# Patient Record
Sex: FEMALE | Race: WHITE
[De-identification: names, ages, dates, MRNs, and addresses within clinical notes are randomized per-mention and may not be internally consistent; named-entity substitution may affect disease eponyms.]

---

## 2018-09-13 ENCOUNTER — HOSPITAL ENCOUNTER (EMERGENCY)
Dept: HOSPITAL 89 - ER | Age: 33
Discharge: HOME | End: 2018-09-13
Payer: MEDICAID

## 2018-09-13 VITALS — DIASTOLIC BLOOD PRESSURE: 80 MMHG | SYSTOLIC BLOOD PRESSURE: 126 MMHG

## 2018-09-13 DIAGNOSIS — R10.12: Primary | ICD-10-CM

## 2018-09-13 LAB — PLATELET COUNT, AUTOMATED: 279 K/UL (ref 150–450)

## 2018-09-13 PROCEDURE — 82040 ASSAY OF SERUM ALBUMIN: CPT

## 2018-09-13 PROCEDURE — 84520 ASSAY OF UREA NITROGEN: CPT

## 2018-09-13 PROCEDURE — 82947 ASSAY GLUCOSE BLOOD QUANT: CPT

## 2018-09-13 PROCEDURE — 96361 HYDRATE IV INFUSION ADD-ON: CPT

## 2018-09-13 PROCEDURE — 96374 THER/PROPH/DIAG INJ IV PUSH: CPT

## 2018-09-13 PROCEDURE — 82247 BILIRUBIN TOTAL: CPT

## 2018-09-13 PROCEDURE — 82310 ASSAY OF CALCIUM: CPT

## 2018-09-13 PROCEDURE — 84155 ASSAY OF PROTEIN SERUM: CPT

## 2018-09-13 PROCEDURE — 82374 ASSAY BLOOD CARBON DIOXIDE: CPT

## 2018-09-13 PROCEDURE — 93005 ELECTROCARDIOGRAM TRACING: CPT

## 2018-09-13 PROCEDURE — 84460 ALANINE AMINO (ALT) (SGPT): CPT

## 2018-09-13 PROCEDURE — 84450 TRANSFERASE (AST) (SGOT): CPT

## 2018-09-13 PROCEDURE — 84075 ASSAY ALKALINE PHOSPHATASE: CPT

## 2018-09-13 PROCEDURE — 84295 ASSAY OF SERUM SODIUM: CPT

## 2018-09-13 PROCEDURE — 99284 EMERGENCY DEPT VISIT MOD MDM: CPT

## 2018-09-13 PROCEDURE — 85025 COMPLETE CBC W/AUTO DIFF WBC: CPT

## 2018-09-13 PROCEDURE — 74177 CT ABD & PELVIS W/CONTRAST: CPT

## 2018-09-13 PROCEDURE — 84484 ASSAY OF TROPONIN QUANT: CPT

## 2018-09-13 PROCEDURE — 96375 TX/PRO/DX INJ NEW DRUG ADDON: CPT

## 2018-09-13 PROCEDURE — 81001 URINALYSIS AUTO W/SCOPE: CPT

## 2018-09-13 PROCEDURE — 82435 ASSAY OF BLOOD CHLORIDE: CPT

## 2018-09-13 PROCEDURE — 71046 X-RAY EXAM CHEST 2 VIEWS: CPT

## 2018-09-13 PROCEDURE — 84132 ASSAY OF SERUM POTASSIUM: CPT

## 2018-09-13 PROCEDURE — 82565 ASSAY OF CREATININE: CPT

## 2018-09-13 NOTE — RADIOLOGY IMAGING REPORT
FACILITY: [facility] 

 

PATIENT NAME: Rosio Sharma

: 1985

MR: [id number]

V: [id number]

EXAM DATE: 

ORDERING PHYSICIAN: DANA TITUS

TECHNOLOGIST: 

 

Location: [facility]

Patient: Rosio Sharma

: 1985

MRN: [id number]

Visit/Account:[id number]

Date of Sevice:  2018

 

ACCESSION #: [id number]

 

2 VIEWS CHEST

 

INDICATION: Chest and abdomen pain

 

COMPARISON: 2012.

 

FINDINGS:

Cardiomediastinal silhouette and pulmonary vessels within normal limits.

There is no focal infiltrate or lobar consolidation.

There is no pneumothorax or pleural effusion.

No nodule.

Upper abdomen is unremarkable. No acute bony abnormality.

 

IMPRESSION:

1. No acute cardiopulmonary process.

 

Report Dictated By: Richie Gonzales at 2018 7:47 PM

 

Report E-Signed By: Richie Gonzales  at 2018 7:49 PM

 

WSN:[id number]

## 2018-09-13 NOTE — RADIOLOGY IMAGING REPORT
FACILITY: [facility] 

 

PATIENT NAME: Rosio Sharma

: 1985

MR: [id number]

V: [id number]

EXAM DATE: 

ORDERING PHYSICIAN: DANA TITUS

TECHNOLOGIST: 

 

Location: [facility] [place]

Patient: Rosio Sharma

: 1985

MRN: [id number]

Visit/Account:[id number]

Date of Sevice:  2018

 

ACCESSION #: [id number]

 

ABDOMEN/PELVIS WITH CONTRAST

 

HISTORY: Abdominal and chest pain.

 

COMPARISON: None.

 

TECHNIQUE: Axial images were obtained from the lung bases through the symphysis pubis with intravenou
s contrast. Sagittal and coronal reformats were performed.

 

One of the following dose optimization techniques was utilized in the performance of this exam: Autom
ated exposure control; adjustment of the mA and/or kV according to the patient's size; or use of an i
terative reconstruction technique. Specific details can be referenced in the facility's radiology CT 
exam operational policy.

 

CONTRAST: 75 mL IV Isovue-370.

 

 

FINDINGS:

 

Lower chest: Normal.

 

Liver: Liver is diffusely decreased in attenuation and enlarged, measuring 21.5 cm, compatible with h
epatic steatosis.

 

Gallbladder/biliary: There are partially calcified stones within the gallbladder. Gallbladder is cont
racted. No pericholecystic fluid or stranding. No intrahepatic or extrahepatic ductal dilation.

 

Pancreas: Normal.

 

Spleen: Normal.

 

Adrenals: There is a 1.2 x 1.0 cm left adrenal lesion (image 42 series 2). The right adrenal gland is
 normal.

 

Kidneys/ureters/bladder: Normal.

 

GI/mesentery/peritoneal cavity: There is no bowel obstruction. There is no wall thickening or pericol
onic stranding. The appendix is normal. There is sigmoid and descending colon diverticulosis without 
diverticulitis.

 

Vessels: There is mild atherosclerotic disease without aneurysm. No dissection.

 

Nodes: Normal.

 

Pelvis: Uterus is absent. Left ovary is not identified and may be surgically absent. Right ovary is n
ormal and contains a dominant follicle. There are phleboliths.

 

Bones/vertebra/soft tissues: There is mild wedging of T11, likely physiologic. There is mild degenera
tive change of the spine. There is right central disc osteophyte at T11-12 (image 36) that is mildly 
deforming the right ventral thecal sac. There are numerous Schmorl nodes. No listhesis.

 

IMPRESSION:

1. Cholelithiasis, but no CT evidence for cholecystitis.

2. Hepatic steatosis. It can progress to steatohepatitis and eventual cirrhosis.

3. Mild atherosclerosis, advanced for age.

4. Degenerative changes of the spine.

5. 1.2 cm left adrenal lesion given size and no reported history of cancer, it is probably benign. Co
nsider 12 month follow up adrenal CT scan.

 

Report Dictated By: Lillian Salvador at 2018 8:11 PM

 

Report E-Signed By: Lillian Salvador  at 2018 8:23 PM

 

WSN:[id number]

## 2018-09-13 NOTE — ER REPORT
History and Physical


Time Seen By MD:  19:05


Hx. of Stated Complaint:  


LEFT SIDED CHEST PAIN FOR 2 DAYS


HPI/ROS


CHIEF COMPLAINT: Chest pain





HISTORY OF PRESENT ILLNESS: 33-year-old female patient presents to emergency 


room with complaint of left-sided chest pain. Patient states this been going on 


for the last 2 days. Patient states that she was sleeping and woke up with pain 


in the left side of the chest. She states pain is worse when she takes a deep 


breath. She states she's not had any fevers, chills, vomiting or diarrhea. 


Patient states she has been nauseated. Patient states that she has not taken any


medication for this. She states that there is nothing seems to make the pain 


better or worse. She states that she does not have any shortness of breath.





REVIEW OF SYSTEMS:


Respiratory: No cough, no dyspnea.


Cardiovascular: As noted above.


Gastrointestinal: No vomiting, no abdominal pain.


Musculoskeletal: No back pain.


Allergies:  


Coded Allergies:  


     amoxicillin (Unverified  Allergy, Mild, hives, 9/13/18)


   


   okay with cephalosporins


     latex (Unverified  Allergy, Mild, rash, 9/13/18)


Home Meds


Active Scripts


Hydrocodone Bit/Acetaminophen (HYDROCODON-ACETAMINOPHEN 5-325) 1 Each Tablet, 1 


EACH PO Q4-6H PRN for PAIN, #12 TAB


   Prov:DANA TITUS         9/13/18


Discontinued Reported Medications


Ibuprofen (IBUPROFEN) 200 Mg Tablet, 1 TAB PO PRN, TAB


   5/23/17


Acetaminophen (TYLENOL) 325 Mg Tablet, 325 MG PO PRN, TAB


   5/23/17


Discontinued Scripts


Ondansetron (ZOFRAN ODT) 4 Mg Tab.rapdis, 4 MG PO Q6H PRN for NAUSEA/VOMITING, 


#20 TAB.SOL 0 Refills


   Prov:CHIVO DOLL MD         8/16/17


Hydrocodone Bit/Acetaminophen (HYDROCODON-ACETAMINOPHEN 5-325) 1 Each Tablet, 1 


EACH PO Q4H PRN for PAIN, #12 TAB 0 Refills


   Prov:CHIVO DOLL MD         8/16/17


Past Medical/Surgical History


Patient has a past medical history of smoking, precancerous lesions removed from


cervix, left arm fracture, or alcohol use.


Patient has surgical history of hysterectomy, tubal ligation.


Reviewed Nurses Notes:  Yes


Hx Smoking:  Yes (1ppd x  12 yrs, trying to quit)


Smoking Status:  Current: Every Day Smoker


Hx Substance Use Disorder:  No


Hx Alcohol Use:  Yes (rare 3 x yr)


Constitutional





Vital Sign - Last 24 Hours








 9/13/18 9/13/18 9/13/18 9/13/18





 18:56 18:57 19:00 19:09


 


Temp  97.9  


 


Pulse  98  94


 


Resp  20  9


 


B/P (MAP) 181/106 (131) 181/106 158/100 (119) 


 


Pulse Ox  95  


 


O2 Delivery  Room Air  


 


    





 9/13/18 9/13/18 9/13/18 9/13/18





 19:15 19:24 19:30 19:45


 


Pulse  84  


 


Resp  11  


 


B/P (MAP) 127/102 (110)  132/94 (107) 146/96 (113)


 


Pulse Ox  95  





 9/13/18 9/13/18 9/13/18 9/13/18





 19:50 20:05 20:20 20:30


 


Pulse 100 95 97 


 


Resp 9 12 16 


 


B/P (MAP)    126/80 (95)


 


Pulse Ox 92 94 92 





 9/13/18   





 20:50   


 


Pulse 82   


 


Resp 24   


 


Pulse Ox 93   








Physical Exam


  General Appearance: The patient is alert, has no immediate need for airway 


protection and no current signs of toxicity.


Respiratory: Chest is non tender, lungs are clear to auscultation.


Cardiac: regular rate and rhythm


Gastrointestinal: Abdomen is soft and tender in the left upper quadrant, no 


masses, bowel sounds normal.


Musculoskeletal:  Neck: Neck is supple and non tender.


   Extremities have full range of motion and are non tender.


Skin: No rashes or lesions.





DIFFERENTIAL DIAGNOSIS: After history and physical exam differential diagnosis 


was considered for abdominal pain including but not limited to appendicitis, 


cholecystitis, gastritis and urinary tract infection.





Medical Decision Making


Data Points


Result Diagram:  


9/13/18 1910 9/13/18 1910





Laboratory





Hematology








Test


 9/13/18


19:10 9/13/18


20:00


 


Red Blood Count


 5.14 M/uL


(4.17-5.56) 





 


Mean Corpuscular Volume


 91.3 fL


(80.0-96.0) 





 


Mean Corpuscular Hemoglobin


 32.5 pg


(26.0-33.0) 





 


Mean Corpuscular Hemoglobin


Concent 35.6 g/dL


(32.0-36.0) 





 


Red Cell Distribution Width


 13.5 %


(11.5-14.5) 





 


Mean Platelet Volume


 9.2 fL


(7.2-11.1) 





 


Neutrophils (%) (Auto)


 51.3 %


(39.4-72.5) 





 


Lymphocytes (%) (Auto)


 37.5 %


(17.6-49.6) 





 


Monocytes (%) (Auto)


 8.0 %


(4.1-12.4) 





 


Eosinophils (%) (Auto)


 2.0 %


(0.4-6.7) 





 


Basophils (%) (Auto)


 1.2 %


(0.3-1.4) 





 


Nucleated RBC Relative Count


(auto) 0.0 /100WBC 


 





 


Neutrophils # (Auto)


 7.0 K/uL


(2.0-7.4) 





 


Lymphocytes # (Auto)


 5.1 K/uL


(1.3-3.6) 





 


Monocytes # (Auto)


 1.1 K/uL


(0.3-1.0) 





 


Eosinophils # (Auto)


 0.3 K/uL


(0.0-0.5) 





 


Basophils # (Auto)


 0.2 K/uL


(0.0-0.1) 





 


Nucleated RBC Absolute Count


(auto) 0.01 K/uL 


 





 


Sodium Level


 137 mmol/L


(137-145) 





 


Potassium Level


 3.9 mmol/L


(3.5-5.0) 





 


Chloride Level


 103 mmol/L


() 





 


Carbon Dioxide Level


 24 mmol/L


(22-31) 





 


Blood Urea Nitrogen


 13 mg/dl


(7-18) 





 


Creatinine


 0.60 mg/dl


(0.52-1.04) 





 


Glomerular Filtration Rate


Calc > 60.0 


 





 


Random Glucose


 88 mg/dl


() 





 


Calcium Level


 9.0 mg/dl


(8.4-10.2) 





 


Total Bilirubin


 0.6 mg/dl


(0.2-1.3) 





 


Aspartate Amino Transf


(AST/SGOT) 32 U/L (0-35) 


 





 


Alanine Aminotransferase


(ALT/SGPT) 39 U/L (0-56) 


 





 


Alkaline Phosphatase 64 U/L (0-126)  


 


Troponin I < 0.012 ng/ml  


 


Total Protein


 7.3 g/dl


(6.3-8.2) 





 


Albumin


 4.1 g/dl


(3.5-5.0) 





 


Urine Color  Yellow 


 


Urine Clarity  Clear 


 


Urine pH


 


 5.0 pH


(4.8-9.5)


 


Urine Specific Gravity  1.034 


 


Urine Protein


 


 Negative mg/dL


(NEGATIVE)


 


Urine Glucose (UA)


 


 Negative mg/dL


(NEGATIVE)


 


Urine Ketones


 


 Negative mg/dL


(NEGATIVE)


 


Urine Blood


 


 Negative


(NEGATIVE)


 


Urine Nitrite


 


 Negative


(NEGATIVE)


 


Urine Bilirubin


 


 Negative


(NEGATIVE)


 


Urine Urobilinogen


 


 Negative mg/dL


(0.2-1.9)


 


Urine Leukocyte Esterase


 


 Negative


(NEGATIVE)


 


Urine RBC


 


 None /HPF


(0-2/HPF)


 


Urine WBC


 


 <1 /HPF


(0-5/HPF)


 


Urine Squamous Epithelial


Cells 


 Many /LPF


(</=FEW)


 


Urine Bacteria


 


 Negative /HPF


(NONE-FEW)


 


Urine Mucus


 


 None /HPF


(NONE-FEW)








Chemistry








Test


 9/13/18


19:10 9/13/18


20:00


 


White Blood Count


 13.6 k/uL


(4.5-11.0) 





 


Red Blood Count


 5.14 M/uL


(4.17-5.56) 





 


Hemoglobin


 16.7 g/dL


(12.0-16.0) 





 


Hematocrit


 46.9 %


(34.0-47.0) 





 


Mean Corpuscular Volume


 91.3 fL


(80.0-96.0) 





 


Mean Corpuscular Hemoglobin


 32.5 pg


(26.0-33.0) 





 


Mean Corpuscular Hemoglobin


Concent 35.6 g/dL


(32.0-36.0) 





 


Red Cell Distribution Width


 13.5 %


(11.5-14.5) 





 


Platelet Count


 279 K/uL


(150-450) 





 


Mean Platelet Volume


 9.2 fL


(7.2-11.1) 





 


Neutrophils (%) (Auto)


 51.3 %


(39.4-72.5) 





 


Lymphocytes (%) (Auto)


 37.5 %


(17.6-49.6) 





 


Monocytes (%) (Auto)


 8.0 %


(4.1-12.4) 





 


Eosinophils (%) (Auto)


 2.0 %


(0.4-6.7) 





 


Basophils (%) (Auto)


 1.2 %


(0.3-1.4) 





 


Nucleated RBC Relative Count


(auto) 0.0 /100WBC 


 





 


Neutrophils # (Auto)


 7.0 K/uL


(2.0-7.4) 





 


Lymphocytes # (Auto)


 5.1 K/uL


(1.3-3.6) 





 


Monocytes # (Auto)


 1.1 K/uL


(0.3-1.0) 





 


Eosinophils # (Auto)


 0.3 K/uL


(0.0-0.5) 





 


Basophils # (Auto)


 0.2 K/uL


(0.0-0.1) 





 


Nucleated RBC Absolute Count


(auto) 0.01 K/uL 


 





 


Glomerular Filtration Rate


Calc > 60.0 


 





 


Calcium Level


 9.0 mg/dl


(8.4-10.2) 





 


Total Bilirubin


 0.6 mg/dl


(0.2-1.3) 





 


Aspartate Amino Transf


(AST/SGOT) 32 U/L (0-35) 


 





 


Alanine Aminotransferase


(ALT/SGPT) 39 U/L (0-56) 


 





 


Alkaline Phosphatase 64 U/L (0-126)  


 


Troponin I < 0.012 ng/ml  


 


Total Protein


 7.3 g/dl


(6.3-8.2) 





 


Albumin


 4.1 g/dl


(3.5-5.0) 





 


Urine Color  Yellow 


 


Urine Clarity  Clear 


 


Urine pH


 


 5.0 pH


(4.8-9.5)


 


Urine Specific Gravity  1.034 


 


Urine Protein


 


 Negative mg/dL


(NEGATIVE)


 


Urine Glucose (UA)


 


 Negative mg/dL


(NEGATIVE)


 


Urine Ketones


 


 Negative mg/dL


(NEGATIVE)


 


Urine Blood


 


 Negative


(NEGATIVE)


 


Urine Nitrite


 


 Negative


(NEGATIVE)


 


Urine Bilirubin


 


 Negative


(NEGATIVE)


 


Urine Urobilinogen


 


 Negative mg/dL


(0.2-1.9)


 


Urine Leukocyte Esterase


 


 Negative


(NEGATIVE)


 


Urine RBC


 


 None /HPF


(0-2/HPF)


 


Urine WBC


 


 <1 /HPF


(0-5/HPF)


 


Urine Squamous Epithelial


Cells 


 Many /LPF


(</=FEW)


 


Urine Bacteria


 


 Negative /HPF


(NONE-FEW)


 


Urine Mucus


 


 None /HPF


(NONE-FEW)








Urinalysis








Test


 9/13/18


20:00


 


Urine Color Yellow 


 


Urine Clarity Clear 


 


Urine pH


 5.0 pH


(4.8-9.5)


 


Urine Specific Gravity 1.034 


 


Urine Protein


 Negative mg/dL


(NEGATIVE)


 


Urine Glucose (UA)


 Negative mg/dL


(NEGATIVE)


 


Urine Ketones


 Negative mg/dL


(NEGATIVE)


 


Urine Blood


 Negative


(NEGATIVE)


 


Urine Nitrite


 Negative


(NEGATIVE)


 


Urine Bilirubin


 Negative


(NEGATIVE)


 


Urine Urobilinogen


 Negative mg/dL


(0.2-1.9)


 


Urine Leukocyte Esterase


 Negative


(NEGATIVE)


 


Urine RBC


 None /HPF


(0-2/HPF)


 


Urine WBC


 <1 /HPF


(0-5/HPF)


 


Urine Squamous Epithelial


Cells Many /LPF


(</=FEW)


 


Urine Bacteria


 Negative /HPF


(NONE-FEW)


 


Urine Mucus


 None /HPF


(NONE-FEW)











EKG/Imaging


EKG Interpretation


12 lead EKG:


      Rhythm: normal sinus rhythm with ventricular rate of 83 beats for minute


      Axis: normal 


      QRS: normal


      ST segments: normal


Imaging


ABDOMEN/PELVIS WITH CONTRAST


 


HISTORY: Abdominal and chest pain.


 


COMPARISON: None.


 


TECHNIQUE: Axial images were obtained from the lung bases through the symphysis 


pubis with intravenous contrast. Sagittal and coronal reformats were performed.


 


One of the following dose optimization techniques was utilized in the 


performance of this exam: Automated exposure control; adjustment of the mA 


and/or kV according to the patient's size; or use of an iterative reconstruction


technique. Specific details can be referenced in the facility's radiology CT 


exam operational policy.


 


CONTRAST: 75 mL IV Isovue-370.


 


 


FINDINGS:


 


Lower chest: Normal.


 


Liver: Liver is diffusely decreased in attenuation and enlarged, measuring 21.5 


cm, compatible with hepatic steatosis.


 


Gallbladder/biliary: There are partially calcified stones within the 


gallbladder. Gallbladder is contracted. No pericholecystic fluid or stranding. 


No intrahepatic or extrahepatic ductal dilation.


 


Pancreas: Normal.


 


Spleen: Normal.


 


Adrenals: There is a 1.2 x 1.0 cm left adrenal lesion (image 42 series 2). The 


right adrenal gland is normal.


 


Kidneys/ureters/bladder: Normal.


 


GI/mesentery/peritoneal cavity: There is no bowel obstruction. There is no wall 


thickening or pericolonic stranding. The appendix is normal. There is sigmoid 


and descending colon diverticulosis without diverticulitis.


 


Vessels: There is mild atherosclerotic disease without aneurysm. No dissection.


 


Nodes: Normal.


 


Pelvis: Uterus is absent. Left ovary is not identified and may be surgically 


absent. Right ovary is normal and contains a dominant follicle. There are 


phleboliths.


 


Bones/vertebra/soft tissues: There is mild wedging of T11, likely physiologic. 


There is mild degenerative change of the spine. There is right central disc 


osteophyte at T11-12 (image 36) that is mildly deforming the right ventral 


thecal sac. There are numerous Schmorl nodes. No listhesis.


 


IMPRESSION:


1. Cholelithiasis, but no CT evidence for cholecystitis.


2. Hepatic steatosis. It can progress to steatohepatitis and eventual cirrhosis.


3. Mild atherosclerosis, advanced for age.


4. Degenerative changes of the spine.


5. 1.2 cm left adrenal lesion given size and no reported history of cancer, it 


is probably benign. Consider 12 month follow up adrenal CT scan.


 


Report Dictated By: Lillian Salvador at 9/13/2018 8:11 PM


 


Report E-Signed By: Lillian Salvador  at 9/13/2018 8:23 PM





2 VIEWS CHEST


 


INDICATION: Chest and abdomen pain


 


COMPARISON: 6/11/2012.


 


FINDINGS:


Cardiomediastinal silhouette and pulmonary vessels within normal limits.


There is no focal infiltrate or lobar consolidation.


There is no pneumothorax or pleural effusion.


No nodule.


Upper abdomen is unremarkable. No acute bony abnormality.


 


IMPRESSION:


1. No acute cardiopulmonary process.


 


Report Dictated By: Richie Gonzales at 9/13/2018 7:47 PM


 


Report E-Signed By: Richie Gonzales  at 9/13/2018 7:49 PM





ED Course/Re-evaluation


ED Course


Patient was admitted to exam room, history and physical were obtained. The 


differential diagnoses were considered. On examination patient had tenderness to


the left upper quadrant of the abdomen, no tenderness to the ribs or chest. A 


CBC, CMP, troponin, EKG, chest x-ray, CT scan of abdomen and pelvis were done. 


The patient had a slightly elevated white count with a left shift, 13,000. 


Patient is afebrile emergency room. EKG was a normal sinus rhythm, chest x-ray 


was negative, CT scan showed no acute findings. CMP was unremarkable. I 


discussed the findings with the patient. I did reexamine her looking for any 


signs of a herpetic rash. However there is nothing that was visible. We will go 


ahead and discharge patient home at this time. We'll treat her with lumbar spine


pain medication. She is follow-up with her primary care provider in the next 


week. I discussed this with patient who verbalized understanding and agreement 


with plan.


Decision to Disposition Date:  Sep 13, 2018


Decision to Disposition Time:  21:04





Depart


Departure


Latest Vital Signs





Vital Signs








  Date Time  Temp Pulse Resp B/P (MAP) Pulse Ox O2 Delivery O2 Flow Rate FiO2


 


9/13/18 20:50  82 24  93   


 


9/13/18 20:30    126/80 (95)    


 


9/13/18 18:57 97.9     Room Air  








Impression:  


   Primary Impression:  


   Abdominal pain


Condition:  Improved


Disposition:  HOME OR SELF-CARE


New Scripts


Hydrocodone Bit/Acetaminophen (HYDROCODON-ACETAMINOPHEN 5-325) 1 Each Tablet


1 EACH PO Q4-6H PRN for PAIN, #12 TAB


   Prov: DANA TITUS         9/13/18


Patient Instructions:  Abdominal Pain (ED)





Additional Instructions:  


Limit activity by pain.


Increase fluid intake.


Follow up with your primary care provider in the next week.


Return to the ER if pain worsens.





Problem Qualifiers








   Primary Impression:  


   Abdominal pain


   Abdominal location:  left upper quadrant  Qualified Codes:  R10.12 - Left 


   upper quadrant pain








DANA TITUS                Sep 13, 2018 20:12

## 2018-09-13 NOTE — EKG
FACILITY: [facility] - [place] 

 

PATIENT NAME: MISAEL FRANKLIN

: [id number]

MR: [id number]

V: [id number]

EXAM DATE: 

ORDERING PHYSICIAN: DANA TITUS

TECHNOLOGIST: PANTIER

 

Test Reason : 

Blood Pressure : ***/*** mmHG

Vent. Rate : 083 BPM     Atrial Rate : 083 BPM

   P-R Int : 162 ms          QRS Dur : 088 ms

    QT Int : 402 ms       P-R-T Axes : 048 038 045 degrees

   QTc Int : 472 ms

 

Normal sinus rhythm

Normal ECG

No previous ECGs available

Confirmed by Prashant Cohen ([id number]) on 2018 8:14:46 PM

 

Referred By:             Confirmed By:Prashant Salas

## 2018-09-17 ENCOUNTER — HOSPITAL ENCOUNTER (OUTPATIENT)
Dept: HOSPITAL 89 - ZZSENDIN | Age: 33
End: 2018-09-17
Attending: PHYSICIAN ASSISTANT
Payer: MEDICAID

## 2018-09-17 DIAGNOSIS — R10.817: Primary | ICD-10-CM

## 2018-09-17 PROCEDURE — 83690 ASSAY OF LIPASE: CPT

## 2018-09-28 ENCOUNTER — HOSPITAL ENCOUNTER (OUTPATIENT)
Dept: HOSPITAL 89 - RAD | Age: 33
End: 2018-09-28
Attending: SURGERY
Payer: MEDICAID

## 2018-09-28 VITALS — WEIGHT: 178 LBS | HEIGHT: 66 IN | BODY MASS INDEX: 28.61 KG/M2

## 2018-09-28 DIAGNOSIS — K21.9: Primary | ICD-10-CM

## 2018-09-28 PROCEDURE — 74240 X-RAY XM UPR GI TRC 1CNTRST: CPT

## 2018-09-28 NOTE — RADIOLOGY IMAGING REPORT
FACILITY: [facility] [place] 

 

PATIENT NAME: Rosio Sharma

: 1985

MR: [id number]

V: [id number]

EXAM DATE: 

ORDERING PHYSICIAN: JOHN ULLRICH

TECHNOLOGIST: 

 

Location: [facility] [place]

Patient: Rosio Sharma

: 1985

MRN: [id number]

Visit/Account:[id number]

Date of Sevice:  2018

 

ACCESSION #: [id number]

 

Exam type: UPPER GI SERIES W/O AIR

 

History: Postprandial vomiting and reflux

 

Comparison: None.

 

Findings:

 

Double contrast upper GI series was performed with thick and thin barium and air contrast.  Small to 
moderate amount of gastroesophageal reflux was observed.  There is no evidence of esophageal narrowin
g or mucosal erosions.  Hiatal hernia was not demonstrated.  No abnormality of the stomach duodenal b
ulb or duodenal C-loop was seen.  The fluoroscopy dose area product was 1107.67 micro-Gray per meter 
squared

 

IMPRESSION:

 

1.  Small to moderate amount of gastroesophageal reflux was observed although no evidence of esophage
al narrowing or mucosal erosion

 

The remainder the upper GI series appeared unremarkable

 

Report Dictated By: Kemi Jackson MD at 2018 12:03 PM

 

Report E-Signed By: Kemi Jackson MD  at 2018 12:05 PM

 

WSN:MARJORIE

## 2018-10-09 ENCOUNTER — HOSPITAL ENCOUNTER (OUTPATIENT)
Dept: HOSPITAL 89 - OR | Age: 33
Discharge: HOME | End: 2018-10-09
Attending: SURGERY
Payer: MEDICAID

## 2018-10-09 VITALS — DIASTOLIC BLOOD PRESSURE: 76 MMHG | SYSTOLIC BLOOD PRESSURE: 124 MMHG

## 2018-10-09 VITALS — WEIGHT: 177 LBS | HEIGHT: 66 IN | BODY MASS INDEX: 28.45 KG/M2

## 2018-10-09 VITALS — DIASTOLIC BLOOD PRESSURE: 87 MMHG | SYSTOLIC BLOOD PRESSURE: 126 MMHG

## 2018-10-09 VITALS — SYSTOLIC BLOOD PRESSURE: 117 MMHG | DIASTOLIC BLOOD PRESSURE: 72 MMHG

## 2018-10-09 VITALS — DIASTOLIC BLOOD PRESSURE: 75 MMHG | SYSTOLIC BLOOD PRESSURE: 108 MMHG

## 2018-10-09 VITALS — SYSTOLIC BLOOD PRESSURE: 115 MMHG | DIASTOLIC BLOOD PRESSURE: 74 MMHG

## 2018-10-09 VITALS — DIASTOLIC BLOOD PRESSURE: 63 MMHG | SYSTOLIC BLOOD PRESSURE: 100 MMHG

## 2018-10-09 VITALS — DIASTOLIC BLOOD PRESSURE: 82 MMHG | SYSTOLIC BLOOD PRESSURE: 127 MMHG

## 2018-10-09 DIAGNOSIS — R11.10: ICD-10-CM

## 2018-10-09 DIAGNOSIS — R10.10: ICD-10-CM

## 2018-10-09 DIAGNOSIS — K80.80: Primary | ICD-10-CM

## 2018-10-09 DIAGNOSIS — J44.9: ICD-10-CM

## 2018-10-09 PROCEDURE — 43239 EGD BIOPSY SINGLE/MULTIPLE: CPT

## 2018-10-09 PROCEDURE — 94640 AIRWAY INHALATION TREATMENT: CPT

## 2018-10-09 PROCEDURE — 88304 TISSUE EXAM BY PATHOLOGIST: CPT

## 2018-10-09 PROCEDURE — 87077 CULTURE AEROBIC IDENTIFY: CPT

## 2018-10-09 PROCEDURE — 88305 TISSUE EXAM BY PATHOLOGIST: CPT

## 2018-10-09 PROCEDURE — 47562 LAPAROSCOPIC CHOLECYSTECTOMY: CPT

## 2018-10-09 NOTE — SHORT(OUTPT) DISCHARGE SUMMARY
Discharge Summary


Reason for Hosp/Final Diag:  


(1) Postprandial vomiting


Status:  Chronic


Hospital Course & Plan:  EGD with biopsies and robotic cholecystectomy completed


without problems.





(2) Cholelithiasis


Status:  Chronic


(3) Abdominal pain


Status:  Acute


Departure


Discharge to:  Home, Self Care





Discharge Instructions


Home Meds


Active Scripts


Docusate Sodium (COLACE) 100 Mg Capsule, 1 CAP PO BID, #30 CAP 0 Refills


   TAKE WITH A FULL GLASS OF WATER


   Prov:ULLRICH,JOHN A MD         10/9/18


Oxycodone Hcl/Acet 5/325 Mg (ENDOCET 5-325 TABLET) 1 Each Tablet, 1-2 TAB PO Q4H


PRN for PAIN, #30 TAB 0 Refills


   Prov:ULLRICH,JOHN A MD         10/9/18


Reported Medications


Acetaminophen (TYLENOL EXTRA STRENGTH) 500 Mg Tablet, 1000 MG PO, TAB


   9/28/18


Gemfibrozil (GEMFIBROZIL) 600 Mg Tablet, 600 MG PO BID


   9/28/18


Dexlansoprazole (DEXILANT) Unknown Strength Cap., 60 MG PO HS


   9/28/18


Follow up Referrals:  


General Surgery - 10/23/18 @ Surgery, General with ULLRICH,JOHN A MD You have a 


follow up appointment scheduled with Dr. Ullrich on Tuesday, 10/23/18, at 


3:30pm.





Diet:  Regular


Activity:  As Tolerated


Special Instructions:  


You may remove the white surgical dressings on Thursday, 10/11/18, then


you can shower.  After showering, leave the incisions open to air but


leave the steristrips in place until they fall off on their own.  Do not


immerse the incisions for 2 weeks.





Problem Qualifiers





(1) Cholelithiasis:  


Cholelithiasis location:  gallbladder  Cholecystitis presence:  without 


cholecystitis  Biliary obstruction:  without biliary obstruction  Qualified 


Codes:  K80.20 - Calculus of gallbladder without cholecystitis without 


obstruction


(2) Abdominal pain:  


Abdominal location:  upper abdomen, unspecified  Qualified Codes:  R10.10 - Up


per abdominal pain, unspecified








ULLRICH,JOHN A MD               Oct 9, 2018 09:03

## 2018-10-09 NOTE — POST OPERATIVE PROGRESS NOTE
Post Operative Progress Note


Date:  Oct 9, 2018


Time:  09:03


Surgeon:  


Ullrich





Dictation number:  [id number]


Anesthesia:  


GETA by Dr. Scanlon


Pre-Op Diagnosis:  


Symptomatic gallstones


Postprandial emesis


Upper abdominal pain


Post-Op Diagnosis:  


BRANDI


Findings:  


Normal EGD, biopsies pending


Procedure(s):  


EGD with biopsies


Robotic cholecystectomy


Specimen Removed:(May be N/A):  


Duodenum


Pyloritek


Gallbladder and contents


Complications:  


None


Fluids:  


See anesthesia record


Estimated Blood Loss:  


Minimal


Date OP Note Dictated:  Oct 9, 2018


Time OP Note Dictated:  09:05











ULLRICH,JOHN A MD               Oct 9, 2018 09:12

## 2018-10-09 NOTE — OPERATIVE REPORT 1
EVENT DATE: October 9, 2018

SURGEON: John Ullrich, M.D.  

ANESTHESIOLOGIST: Anthony Scanlon MD 

ANESTHESIA: General endotracheal.





PREOPERATIVE DIAGNOSIS  

1.  Upper abdominal pain.

2.  Postprandial emesis.

3.  Symptomatic gallstones.



POSTOPERATIVE DIAGNOSIS 

1.  Upper abdominal pain.

2.  Postprandial emesis.

3.  Symptomatic gallstones.



PROCEDURE PERFORMED 

1.  Esophagogastroduodenoscopy with biopsies.  Please refer to the endoscopy 

report for details about this.

2.  Robotic cholecystectomy.



ESTIMATED BLOOD LOSS 

Minimal.



FINDINGS

Patient's EGD was unremarkable.  I did not find any evidence of inflammation, 

ulcers or other abnormalities. Biopsies are pending.  The gallbladder had small 

flecks of gallstones in it but no obvious gross inflammation.



SPECIMENS 

1.  Duodenal biopsy.

2.  PyloriTek.

3.  Gallbladder and contents.  



INDICATIONS 

This is a 33-year-old female who presented to my office with upper abdominal 

pain and postprandial emesis.  Her pain was somewhat in the epigastric midline 

as well as the right upper quadrant.  There was concern about an upper GI cause 

separate from her gallbladder so she was requesting to have an EGD in addition 

to having her gallbladder removed since her ultrasound did show gallstones. 



DESCRIPTION OF PROCEDURE 

The patient was brought to the operating room and placed supine on the operating

table.  General endotracheal anesthesia was administered and we completed the 

EGD first.  Please refer to the endoscopy report for details about this.  After 

the EGD was completed, her abdomen was prepped and draped in the sterile 

fashion.  Another time-out was completed. I injected the infraumbilical skin 

with 0.5% ropivacaine plain.  I made a curvilinear smiley face type incision in 

the infraumbilical rim and dissected through the dermis and into the 

subcutaneous fat.  I identified the midline fascia, made a vertical incision in 

the midline fascia, grasped the fascial ligatures with Kocher clamps and 

retracted the abdominal wall towards the ceiling to move it away from the 

underlying viscera.  I then was able to penetrate the peritoneal cavity with my 

finger and then placed two interrupted 0 Vicryl sutures transversely through the

vertical fascial defect and the 12 mm robotic Scruggs type port though this wound

and secured into place with the sutures.  I insufflated the abdomen to a 

pressure of 50 mmHg and inserted the robotic camera into the patient's abdomen. 

Gross inspection of the abdominal cavity did not reveal any obvious evidence of 

gross pathology or entry related injuries.  Next, I placed an 8 mm robotic port 

in the right mid abdomen and two 8 mm robotic ports in the left side of the 

abdomen, one in the left mid abdomen higher than the level of the umbilicus and 

then one in the anterior axillary line in the subcostal area.  I placed the 

patient in reverse Trendelenburg and planed towards the left and moved the 

viscera away from the right upper quadrant and brought the robot in and docked 

and targeted the robot.  I then inserted the instruments under direct 

visualization and when everything was set up I scrubbed out and went to the 

console.  I grasped the fundus of the gallbladder and retracted it with a 

ProGrasp grasper and there were some adhesions of the gallbladder, which I 

easily took down by retracting them with the caudia and using the hook. Once I 

got down to the infundibulum, I divided the peritoneum overlying the 

infundibulum in both the medial and lateral aspects of the gallbladder and 

stripped the peritoneum and subperitoneal contents down from the infundibulum 

until I cleaned off the cystic duct. I used the FireFly to identify the cystic 

duct and I could also clearly see the common bile duct, which glowed brightly, 

and I was well away from the common bile duct.  Once I cleaned off the cystic 

duct circumferentially, I passed three clips distally and one clip at the 

infundibulum of the cystic duct and divided the duct between clips.  I then 

continued dissecting and I had not yet identified the artery so I was very 

careful in this regard and then I ultimately was able to identify the cystic 

artery, which was very posterior, so this was cleaned off circumferentially and 

I clipped it proximally and distally and divided between clips.  I then 

 the posterior attachments of the gallbladder to separate from the 

gallbladder fossa and then placed the gallbladder in a surgical specimen 

retrieval bag and removed it from the abdomen through the umbilical port site.  

I irrigated and dried the right upper quadrant and made sure there was no bile 

leaks or bleeding and there was none.  The clips were all in good position and 

the gallbladder fossa was dry.  I then removed all the instruments and undocked 

the robot and then placed a figure-of-eight 0 Vicryl suture transversely through

the defect in the umbilicus and tied all three of these down with good 

reapproximation of the fascial edges.  I then closed the skin at each portal 

site with 4-0 Monocryl subcuticular sutures. Skin was cleaned and dried and 

steri-strips applied followed by sterile surgical dressings.  The patient was 

awakened and extubated in the operating room and transferred to the recovery 

room in stable condition, having tolerated the procedure without any apparent 

problems.





[place]

## 2018-10-23 ENCOUNTER — HOSPITAL ENCOUNTER (OUTPATIENT)
Dept: HOSPITAL 89 - LAB | Age: 33
End: 2018-10-23
Attending: SURGERY
Payer: MEDICAID

## 2018-10-23 DIAGNOSIS — R10.9: Primary | ICD-10-CM

## 2018-10-23 DIAGNOSIS — R11.2: ICD-10-CM

## 2018-10-23 LAB — PLATELET COUNT, AUTOMATED: 283 K/UL (ref 150–450)

## 2018-10-23 PROCEDURE — 82310 ASSAY OF CALCIUM: CPT

## 2018-10-23 PROCEDURE — 84132 ASSAY OF SERUM POTASSIUM: CPT

## 2018-10-23 PROCEDURE — 82248 BILIRUBIN DIRECT: CPT

## 2018-10-23 PROCEDURE — 83690 ASSAY OF LIPASE: CPT

## 2018-10-23 PROCEDURE — 36415 COLL VENOUS BLD VENIPUNCTURE: CPT

## 2018-10-23 PROCEDURE — 84075 ASSAY ALKALINE PHOSPHATASE: CPT

## 2018-10-23 PROCEDURE — 84155 ASSAY OF PROTEIN SERUM: CPT

## 2018-10-23 PROCEDURE — 84295 ASSAY OF SERUM SODIUM: CPT

## 2018-10-23 PROCEDURE — 82374 ASSAY BLOOD CARBON DIOXIDE: CPT

## 2018-10-23 PROCEDURE — 82565 ASSAY OF CREATININE: CPT

## 2018-10-23 PROCEDURE — 84450 TRANSFERASE (AST) (SGOT): CPT

## 2018-10-23 PROCEDURE — 82040 ASSAY OF SERUM ALBUMIN: CPT

## 2018-10-23 PROCEDURE — 84520 ASSAY OF UREA NITROGEN: CPT

## 2018-10-23 PROCEDURE — 85025 COMPLETE CBC W/AUTO DIFF WBC: CPT

## 2018-10-23 PROCEDURE — 82947 ASSAY GLUCOSE BLOOD QUANT: CPT

## 2018-10-23 PROCEDURE — 82247 BILIRUBIN TOTAL: CPT

## 2018-10-23 PROCEDURE — 82435 ASSAY OF BLOOD CHLORIDE: CPT

## 2018-10-23 PROCEDURE — 84460 ALANINE AMINO (ALT) (SGPT): CPT

## 2018-10-29 ENCOUNTER — HOSPITAL ENCOUNTER (OUTPATIENT)
Dept: HOSPITAL 89 - CT | Age: 33
End: 2018-10-29
Attending: SURGERY
Payer: MEDICAID

## 2018-10-29 DIAGNOSIS — R19.09: ICD-10-CM

## 2018-10-29 DIAGNOSIS — K76.0: Primary | ICD-10-CM

## 2018-10-29 DIAGNOSIS — K57.30: ICD-10-CM

## 2018-10-29 DIAGNOSIS — Z90.49: ICD-10-CM

## 2018-10-29 PROCEDURE — 74178 CT ABD&PLV WO CNTR FLWD CNTR: CPT

## 2018-10-29 NOTE — RADIOLOGY IMAGING REPORT
FACILITY: [facility] 

 

PATIENT NAME: Rosio Sharma

: 1985

MR: [id number]

V: [id number]

EXAM DATE: 

ORDERING PHYSICIAN: JOHN ULLRICH

TECHNOLOGIST: 

 

Location: [facility]

Patient: Rosio Sharma

: 1985

MRN: [id number]

Visit/Account:[id number]

Date of Sevice: 10/29/2018

 

ACCESSION #: [id number]

 

ABDOMEN/PELVIS W/WO CONTRAST

 

HISTORY:  Generalized abdomen pain x3 months, cholecystectomy x3 weeks, nausea and vomiting

 

TECHNIQUE: Axial images acquired through the abdomen/pelvis both with and without IV contrast..  Marilee
nal and sagittal reformatting also performed.Dose Lowering Technique

 

One of the following dose optimization techniques was utilized in the performance of this exam: Autom
ated exposure control; adjustment of the mA and/or kV according to the patient's size; or use of an i
terative  reconstruction technique.  Specific details can be referenced in the facility's radiology C
T exam operational policy.

 

CONTRAST:  75 mL Isovue-370

 

COMPARISON:  2018

 

FINDINGS:

 

Visualized lung bases:  Negative.

 

Hepatobiliary:  There is diffuse hepatic steatosis.  There are postsurgical changes from a cholecyste
ctomy with no abnormal collections identified in the gallbladder fossa.  There is no evidence of bili
vladimir ductal dilatation

 

Spleen:  Negative.

 

Adrenals:  1.2 cm left adrenal nodule remains relatively unchanged

 

Pancreas:  Negative.

 

Kidneys ureters and bladder: Negative.

 

Genitalia:  Hysterectomy

 

GI: There Is diverticulosis of the left-sided colon.  There does appear to be mild thickening of the 
distal sigmoid colon, no surrounding inflammatory changes are seen although this may be related to mi
ld diverticulitis.

 

Vessels/spaces/nodes:  Negative.

 

Bones/soft tissues:  Mild spondylotic changes in the thoracal lumbar spine again seen

 

Additional findings:  None pertinent.

 

IMPRESSION:

 

Diffuse hepatic steatosis

 

Postsurgical changes from cholecystectomy with no abnormal collection identified in the gallbladder f
tr

 

1.2 cm left adrenal nodule remains stable

 

Diverticulosis left-sided colon.  There does appear to be mild thickening of the distal sigmoid colon
 although no surrounding inflammatory change is seen.  This could be related to mild diverticulitis.

 

Report Dictated By: Kemi Jackson MD at 10/29/2018 1:22 PM

 

Report E-Signed By: Kemi Jackson MD  at 10/29/2018 3:01 PM

 

WSN:[id number]

## 2019-01-07 ENCOUNTER — HOSPITAL ENCOUNTER (OUTPATIENT)
Dept: HOSPITAL 89 - LAB | Age: 34
End: 2019-01-07
Attending: SURGERY
Payer: MEDICAID

## 2019-01-07 DIAGNOSIS — R10.9: ICD-10-CM

## 2019-01-07 DIAGNOSIS — R19.7: Primary | ICD-10-CM

## 2019-01-07 PROCEDURE — 87205 SMEAR GRAM STAIN: CPT

## 2019-01-07 PROCEDURE — 87045 FECES CULTURE AEROBIC BACT: CPT

## 2019-03-05 ENCOUNTER — HOSPITAL ENCOUNTER (OUTPATIENT)
Dept: HOSPITAL 89 - LAB | Age: 34
End: 2019-03-05
Attending: UROLOGY
Payer: MEDICAID

## 2019-03-05 DIAGNOSIS — N28.89: Primary | ICD-10-CM

## 2019-03-05 PROCEDURE — 82533 TOTAL CORTISOL: CPT

## 2019-03-05 PROCEDURE — 36415 COLL VENOUS BLD VENIPUNCTURE: CPT

## 2019-03-05 PROCEDURE — 83835 ASSAY OF METANEPHRINES: CPT

## 2019-04-11 ENCOUNTER — HOSPITAL ENCOUNTER (OUTPATIENT)
Dept: HOSPITAL 89 - LAB | Age: 34
End: 2019-04-11
Attending: UROLOGY
Payer: MEDICAID

## 2019-04-11 DIAGNOSIS — E27.9: Primary | ICD-10-CM

## 2019-04-11 DIAGNOSIS — D44.10: ICD-10-CM

## 2019-04-11 PROCEDURE — 82565 ASSAY OF CREATININE: CPT

## 2019-04-11 PROCEDURE — 36415 COLL VENOUS BLD VENIPUNCTURE: CPT

## 2019-04-16 ENCOUNTER — HOSPITAL ENCOUNTER (OUTPATIENT)
Dept: HOSPITAL 89 - CT | Age: 34
End: 2019-04-16
Attending: UROLOGY
Payer: MEDICAID

## 2019-04-16 DIAGNOSIS — E27.9: ICD-10-CM

## 2019-04-16 DIAGNOSIS — D44.10: Primary | ICD-10-CM

## 2019-04-16 PROCEDURE — 74170 CT ABD WO CNTRST FLWD CNTRST: CPT

## 2019-04-16 NOTE — RADIOLOGY IMAGING REPORT
[place] [facility] [place] 

 

PATIENT NAME: Rosio Sharma

: 1985

MR: [id number]

V: [id number]

EXAM DATE: 

ORDERING PHYSICIAN: KANE ARCHER

TECHNOLOGIST: 

 

Location: [facility]

Patient: Rosio Sharma

: 1985

MRN: [id number]

Visit/Account:[id number]

Date of Sevice:  2019

 

ACCESSION #: [id number]

 

CT ABDOMEN WITH AND WITHOUT CONTRAST

 

CLINICAL INFORMATION:   Nonfunctional left adrenal mass

 

TECHNIQUE:   Axial CT images were obtained through the abdomen before and after injection of nonionic
 iodinated intravenous contrast. Reformatted coronal and sagittal images were also obtained. Pre cont
rast and delayed images were obtained. Dose Lowering Technique

 

One of the following dose optimization techniques was utilized in the performance of this exam: Autom
ated exposure control; adjustment of the mA and/or kV according to the patient's size; or use of an i
terative  reconstruction technique.  Specific details can be referenced in the facility's radiology C
T exam operational policy.

 

CONTRAST:   75ml of IV Isovue-370 contrast.

 

COMPARISON:   2018.

 

FINDINGS:

Lower lung fields: Limited views lower lung field are unremarkable.

 

Liver: There is diffuse severe hepatic steatosis.  There are post surgical changes from a cholecystec
tila

Biliary: Postsurgical changes from a cholecystectomy

Pancreas: Normal appearance.

Spleen: Normal appearance.

Adrenal glands: The previously noted left adrenal mass appears slightly less prominent now measuring 
1 cm opposed 1.2 cm.  There is greater than 50% washout of contrast on the delayed images consistent 
with a benign adenoma.  The right adrenal gland appears unremarkable

Kidneys / retroperitoneum: No evidence of nephrolithiasis or hydronephrosis

 

Bowel / peritoneum / mesenteries: The visualized small and large bowel appear unremarkable.

 

Vessels: No significant atherosclerotic calcification seen throughout a nonaneurysmal abdominal aorta
 and branches.

 

Musculoskeletal / Body wall: Mild spondylotic changes of the thoracolumbar spine

 

Lymph node assessment: There shotty mesenteric and retroperitoneal lymph nodes

 

 

 

 

IMPRESSION:

1.  The previously noted left adrenal mass appears slightly less prominent.  There is greater than 50
% washout of contrast on the delayed images consistent with a benign adenoma

 

Additional chronic findings as described

2.

3.

 

Report Dictated By: Kemi Jackson MD at 2019 9:46 AM

 

Report E-Signed By: Kemi Jackson MD  at 2019 10:31 AM

 

WSN:[email or web address]